# Patient Record
Sex: FEMALE | Race: BLACK OR AFRICAN AMERICAN | NOT HISPANIC OR LATINO
[De-identification: names, ages, dates, MRNs, and addresses within clinical notes are randomized per-mention and may not be internally consistent; named-entity substitution may affect disease eponyms.]

---

## 2018-05-14 ENCOUNTER — RESULT REVIEW (OUTPATIENT)
Age: 33
End: 2018-05-14

## 2019-04-11 ENCOUNTER — RESULT REVIEW (OUTPATIENT)
Age: 34
End: 2019-04-11

## 2019-06-24 ENCOUNTER — OUTPATIENT (OUTPATIENT)
Dept: OUTPATIENT SERVICES | Facility: HOSPITAL | Age: 34
LOS: 1 days | Discharge: HOME | End: 2019-06-24

## 2019-06-24 ENCOUNTER — RESULT REVIEW (OUTPATIENT)
Age: 34
End: 2019-06-24

## 2019-06-25 DIAGNOSIS — E78.00 PURE HYPERCHOLESTEROLEMIA, UNSPECIFIED: ICD-10-CM

## 2019-06-25 DIAGNOSIS — N39.0 URINARY TRACT INFECTION, SITE NOT SPECIFIED: ICD-10-CM

## 2019-06-25 DIAGNOSIS — Z00.00 ENCOUNTER FOR GENERAL ADULT MEDICAL EXAMINATION WITHOUT ABNORMAL FINDINGS: ICD-10-CM

## 2019-07-22 ENCOUNTER — OUTPATIENT (OUTPATIENT)
Dept: OUTPATIENT SERVICES | Facility: HOSPITAL | Age: 34
LOS: 1 days | Discharge: HOME | End: 2019-07-22

## 2019-07-23 DIAGNOSIS — E78.00 PURE HYPERCHOLESTEROLEMIA, UNSPECIFIED: ICD-10-CM

## 2019-08-05 ENCOUNTER — RESULT REVIEW (OUTPATIENT)
Age: 34
End: 2019-08-05

## 2020-01-13 ENCOUNTER — RESULT REVIEW (OUTPATIENT)
Age: 35
End: 2020-01-13

## 2020-08-12 ENCOUNTER — RESULT REVIEW (OUTPATIENT)
Age: 35
End: 2020-08-12

## 2021-02-08 ENCOUNTER — RESULT REVIEW (OUTPATIENT)
Age: 36
End: 2021-02-08

## 2021-03-17 ENCOUNTER — RESULT REVIEW (OUTPATIENT)
Age: 36
End: 2021-03-17

## 2021-03-31 PROBLEM — Z00.00 ENCOUNTER FOR PREVENTIVE HEALTH EXAMINATION: Status: ACTIVE | Noted: 2021-03-31

## 2021-04-13 ENCOUNTER — APPOINTMENT (OUTPATIENT)
Dept: GYNECOLOGIC ONCOLOGY | Facility: CLINIC | Age: 36
End: 2021-04-13
Payer: COMMERCIAL

## 2021-04-13 VITALS
HEART RATE: 76 BPM | SYSTOLIC BLOOD PRESSURE: 129 MMHG | DIASTOLIC BLOOD PRESSURE: 83 MMHG | BODY MASS INDEX: 18.78 KG/M2 | WEIGHT: 110 LBS | HEIGHT: 64 IN

## 2021-04-13 DIAGNOSIS — Z78.9 OTHER SPECIFIED HEALTH STATUS: ICD-10-CM

## 2021-04-13 PROCEDURE — 99072 ADDL SUPL MATRL&STAF TM PHE: CPT

## 2021-04-13 PROCEDURE — 99203 OFFICE O/P NEW LOW 30 MIN: CPT

## 2021-04-13 RX ORDER — NORETHINDRONE ACETATE AND ETHINYL ESTRADIOL 1.5; 3 MG/1; UG/1
TABLET ORAL
Refills: 0 | Status: ACTIVE | COMMUNITY

## 2021-04-13 NOTE — HISTORY OF PRESENT ILLNESS
[FreeTextEntry1] : Referred by (GYN) Dr. Watters\par PCP: Dr. Ring\par \par Ms. Sommers is a 35 year old  female, with a LMP on 3/21, referred from Dr. Watters, for persistent cervical dysplasia.  Patient notes an abnormal pap smear in .\par \par 2018- Pap- ASCUS\par 2019- Pap- HSIL\par 2019- Colposcopy- \par   ECC- extremely scant fragments of benign endocervical glands and of benign endometrial tissue with endometrial stromal breakdown\par   Cervix at 11- LUAN 2\par   Cervix at 1- LUAN 2\par   Cervix at 8 - ULAN 2\par \par \par 2019- Upper cervix LEEP- HSIL, negative margins\par   Lower cervix LEEP- HSIL, negative endocervical margins, positive ectocervical margin\par   Post ECC- dysplastic squamous epithelium \par \par 2020- Pap- negative\par \par 2020- Pap- ASCUS\par \par 2021- Pap- ASCUS\par \par 3/17/2021- Colposcopy- \par   ECC- endocervical mucosa admixed with inflammatory mucin\par   Endocervical polyp- negative\par   Cervix at 2- negative\par   Cervix at 5- high grade dysplasia\par    Cervix at 7- high grade dysplasia\par \par PMHx- n/a\par PSHx- LEEP\par Social Hx- monogamous relationship, never smoker, did not receive the Gardasil vaccine\par \par She has postcoital bleeding.  She reports no pelvic pain or pressure.  She denies any other associated signs or symptoms.  She is here today to discuss further management.  \par \par HM\par Pap- see above\par Mammo- n/a\par Colonoscopy- n/a

## 2021-04-13 NOTE — DISCUSSION/SUMMARY
[Reviewed Clinical Lab Test(s)] : Results of clinical tests were reviewed. [Discuss Alternatives/Risks/Benefits w/Patient] : All alternatives, risks, and benefits were discussed with the patient/family and all questions were answered.  Patient expressed good understanding and appreciates the importance of follow up as recommended. [FreeTextEntry1] : I sat down with Kate and reviewed the diagnosis and management of high grade cervical dysplasia. I would recommend proceeding with a cone biopsy in the ambulatory surgery unit. I explained that a cone biopsy has both diagnostic and therapeutic benefits. She understands that there is a small risk of finding an invasive cervical cancer on cone biopsy and that further surgery would be required in this circumstance. We reviewed the risks and benefits of the procedure. We discussed the role of persistent HPV infection in the generation of lower genital tract dysplasia. Kate had the opportunity to ask questions and expressed understanding of the information presented. She would like to proceed with a CKC and will be scheduled in the near future.

## 2021-04-13 NOTE — PHYSICAL EXAM
[Abnormal] : Cervix: Abnormal [Normal] : Anus and perineum: Normal sphincter tone, no masses, no prolapse. [de-identified] : VIA - AWE consistent with high grade dysplasia on posterior cervix 4-8 [Fully active, able to carry on all pre-disease performance without restriction] : Status 0 - Fully active, able to carry on all pre-disease performance without restriction

## 2021-04-13 NOTE — OB HISTORY
[Total Preg ___] : : [unfilled] [Abortions ___] : [unfilled] (abortions) [Definite ___ (Date)] : the last menstrual period was [unfilled] [Menarche Age ____] : age at menarche was [unfilled]

## 2021-04-13 NOTE — PAST MEDICAL HISTORY
[Menstruating] : The patient is menstruating [Menarche Age ____] : age at menarche was [unfilled] [Definite ___ (Date)] : the last menstrual period was [unfilled] [Total Preg ___] : G[unfilled] [Abortions ___] : Abortions:[unfilled]

## 2021-05-17 ENCOUNTER — NON-APPOINTMENT (OUTPATIENT)
Age: 36
End: 2021-05-17

## 2021-07-09 ENCOUNTER — OUTPATIENT (OUTPATIENT)
Dept: OUTPATIENT SERVICES | Facility: HOSPITAL | Age: 36
LOS: 1 days | End: 2021-07-09

## 2021-07-09 VITALS
RESPIRATION RATE: 16 BRPM | WEIGHT: 115.08 LBS | TEMPERATURE: 98 F | HEART RATE: 78 BPM | OXYGEN SATURATION: 98 % | HEIGHT: 64 IN | DIASTOLIC BLOOD PRESSURE: 78 MMHG | SYSTOLIC BLOOD PRESSURE: 120 MMHG

## 2021-07-09 DIAGNOSIS — N87.9 DYSPLASIA OF CERVIX UTERI, UNSPECIFIED: ICD-10-CM

## 2021-07-09 DIAGNOSIS — R87.619 UNSPECIFIED ABNORMAL CYTOLOGICAL FINDINGS IN SPECIMENS FROM CERVIX UTERI: ICD-10-CM

## 2021-07-09 DIAGNOSIS — Z87.898 PERSONAL HISTORY OF OTHER SPECIFIED CONDITIONS: Chronic | ICD-10-CM

## 2021-07-09 LAB
HCG UR QL: NEGATIVE — SIGNIFICANT CHANGE UP
HCT VFR BLD CALC: 44.8 % — SIGNIFICANT CHANGE UP (ref 34.5–45)
HGB BLD-MCNC: 14.8 G/DL — SIGNIFICANT CHANGE UP (ref 11.5–15.5)
MCHC RBC-ENTMCNC: 30.8 PG — SIGNIFICANT CHANGE UP (ref 27–34)
MCHC RBC-ENTMCNC: 33 GM/DL — SIGNIFICANT CHANGE UP (ref 32–36)
MCV RBC AUTO: 93.3 FL — SIGNIFICANT CHANGE UP (ref 80–100)
NRBC # BLD: 0 /100 WBCS — SIGNIFICANT CHANGE UP
NRBC # FLD: 0 K/UL — SIGNIFICANT CHANGE UP
PLATELET # BLD AUTO: 323 K/UL — SIGNIFICANT CHANGE UP (ref 150–400)
RBC # BLD: 4.8 M/UL — SIGNIFICANT CHANGE UP (ref 3.8–5.2)
RBC # FLD: 12.1 % — SIGNIFICANT CHANGE UP (ref 10.3–14.5)
WBC # BLD: 4.46 K/UL — SIGNIFICANT CHANGE UP (ref 3.8–10.5)
WBC # FLD AUTO: 4.46 K/UL — SIGNIFICANT CHANGE UP (ref 3.8–10.5)

## 2021-07-09 RX ORDER — SODIUM CHLORIDE 9 MG/ML
1000 INJECTION, SOLUTION INTRAVENOUS
Refills: 0 | Status: DISCONTINUED | OUTPATIENT
Start: 2021-07-23 | End: 2021-08-06

## 2021-07-09 NOTE — H&P PST ADULT - HISTORY OF PRESENT ILLNESS
This is a 34 y/o female who presents with h/o LEEP procedure in 2019. Recently had abnormal pap smear. Further intervention warranted. Scheduled for cold knife cone biopsy

## 2021-07-09 NOTE — H&P PST ADULT - NSICDXPROBLEM_GEN_ALL_CORE_FT
PROBLEM DIAGNOSES  Problem: Abnormal Pap smear of cervix  Assessment and Plan: This is a 34 y/o female who is scheduled for cold knife cone biopsy on 7-23-21  * Scheduled for covid testing  * Given preo instructions

## 2021-07-09 NOTE — H&P PST ADULT - NSICDXPASTMEDICALHX_GEN_ALL_CORE_FT
PAST MEDICAL HISTORY:  Abnormal Pap smear of cervix      x 1    History of abnormal Pap smear 2019

## 2021-07-16 DIAGNOSIS — Z01.818 ENCOUNTER FOR OTHER PREPROCEDURAL EXAMINATION: ICD-10-CM

## 2021-07-20 ENCOUNTER — APPOINTMENT (OUTPATIENT)
Dept: DISASTER EMERGENCY | Facility: CLINIC | Age: 36
End: 2021-07-20

## 2021-07-20 ENCOUNTER — NON-APPOINTMENT (OUTPATIENT)
Age: 36
End: 2021-07-20

## 2021-07-21 LAB — SARS-COV-2 N GENE NPH QL NAA+PROBE: NOT DETECTED

## 2021-07-22 ENCOUNTER — TRANSCRIPTION ENCOUNTER (OUTPATIENT)
Age: 36
End: 2021-07-22

## 2021-07-23 ENCOUNTER — RESULT REVIEW (OUTPATIENT)
Age: 36
End: 2021-07-23

## 2021-07-23 ENCOUNTER — OUTPATIENT (OUTPATIENT)
Dept: OUTPATIENT SERVICES | Facility: HOSPITAL | Age: 36
LOS: 1 days | Discharge: HOME | End: 2021-07-23
Payer: COMMERCIAL

## 2021-07-23 ENCOUNTER — APPOINTMENT (OUTPATIENT)
Dept: GYNECOLOGIC ONCOLOGY | Facility: HOSPITAL | Age: 36
End: 2021-07-23

## 2021-07-23 VITALS
HEART RATE: 75 BPM | HEIGHT: 64 IN | SYSTOLIC BLOOD PRESSURE: 95 MMHG | DIASTOLIC BLOOD PRESSURE: 67 MMHG | OXYGEN SATURATION: 100 % | WEIGHT: 115.08 LBS | TEMPERATURE: 98 F | RESPIRATION RATE: 18 BRPM

## 2021-07-23 VITALS — HEART RATE: 69 BPM | OXYGEN SATURATION: 100 % | RESPIRATION RATE: 16 BRPM

## 2021-07-23 DIAGNOSIS — N87.9 DYSPLASIA OF CERVIX UTERI, UNSPECIFIED: ICD-10-CM

## 2021-07-23 DIAGNOSIS — Z87.898 PERSONAL HISTORY OF OTHER SPECIFIED CONDITIONS: Chronic | ICD-10-CM

## 2021-07-23 LAB — HCG UR QL: NEGATIVE — SIGNIFICANT CHANGE UP

## 2021-07-23 PROCEDURE — 88342 IMHCHEM/IMCYTCHM 1ST ANTB: CPT | Mod: 26

## 2021-07-23 PROCEDURE — 88305 TISSUE EXAM BY PATHOLOGIST: CPT | Mod: 26

## 2021-07-23 PROCEDURE — 57520 CONIZATION OF CERVIX: CPT

## 2021-07-23 PROCEDURE — 88341 IMHCHEM/IMCYTCHM EA ADD ANTB: CPT | Mod: 26

## 2021-07-23 PROCEDURE — 88307 TISSUE EXAM BY PATHOLOGIST: CPT | Mod: 26

## 2021-07-23 RX ORDER — SODIUM CHLORIDE 9 MG/ML
1000 INJECTION, SOLUTION INTRAVENOUS
Refills: 0 | Status: DISCONTINUED | OUTPATIENT
Start: 2021-07-23 | End: 2021-08-06

## 2021-07-23 RX ORDER — NORETHINDRONE AND ETHINYL ESTRADIOL 0.4-0.035
1 KIT ORAL
Qty: 0 | Refills: 0 | DISCHARGE

## 2021-07-23 NOTE — BRIEF OPERATIVE NOTE - NSICDXBRIEFPROCEDURE_GEN_ALL_CORE_FT
PROCEDURES:  Cervical cold knife cone biopsy 23-Jul-2021 10:14:40  Andree Guy  Endocervical curettage 23-Jul-2021 10:14:56  Andree Guy

## 2021-07-23 NOTE — ASU DISCHARGE PLAN (ADULT/PEDIATRIC) - ASU DC SPECIAL INSTRUCTIONSFT
Return to your regular way of eating.  Resume normal activity as tolerated, but no heavy lifting or strenuous activity for 6 weeks.  No driving for next 2 weeks and/or while on narcotic pain medication.  Complete vaginal rest, no tampons, no douching, no tub bathing, no sexual activities for 6 weeks unless otherwise instructed by your doctor.  Call your doctor with any signs and symptoms of infection such as fever, chills, nausea or vomiting.  Call your doctor with redness or swelling at the incision site, fluid leakage or wound separation.  Call your doctor if you're unable to tolerate food or have difficulty urinating.  Call your doctor if you have pain that is not relieved by your prescribed medications.  Notify your doctor with any other concerns.  Follow up with Dr. Rodas on Aug 10.

## 2021-07-23 NOTE — ASU DISCHARGE PLAN (ADULT/PEDIATRIC) - CARE PROVIDER_API CALL
Onset: today    Location / description: today has been coughing up small specks of blood. No SOB or chest pain. Phlegm also yellow in color. Denies streaks or clots of blood in phlegm. +nasal d/c for 1 week. No fever. Advised to FU within 2-3 days. Verb understanding care advice and to call back with further questions/concerns. Pain Scale (1 - 10), 10 highest: 0    What improves/worsens symptoms: nothing    Symptom specific medications: see list    LMP : No LMP recorded.   Are you pregnant or breast feeding: na    Recent Care: 12/12/18 [facility]    Reason for Disposition  â¢ [1] More than one episode of coughing up blood AND [2] < 1 tablespoon (15 ml) of pure red blood    Protocols used: COUGHING UP BLOOD-A-AH
Regardinyr/ red specks in vision   ----- Message from [address] sent at 2019 11:16 PM CST -----  Patient Name: Arielle Brewer  Specialist or PCP:Dr Guicho Hyman   Pregnant (If Yes, how long?):no  Symptoms:red specks in vision  Call Back #:[phone]  Is the patientâs permanent residence located in [place], [place], or a compact State?  Tami Amaro [place] [address]  Call Center Account #:[id number]
Bina Rodas)  Gynecologic Oncology; Obstetrics and Gynecology  [address] [place] [address]  Phone: [phone]  Fax: [phone]  Established Patient  Scheduled Appointment: 08/23/2021 04:00 PM

## 2021-07-23 NOTE — ASU DISCHARGE PLAN (ADULT/PEDIATRIC) - PROVIDER TOKENS
PROVIDER:[TOKEN:[[id number]],SCHEDULEDAPPT:[08/23/2021],SCHEDULEDAPPTTIME:[04:00 PM],ESTABLISHEDPATIENT:[T]]

## 2021-07-23 NOTE — BRIEF OPERATIVE NOTE - OPERATION/FINDINGS
EUA: External genitalia wnl. Small, anteverted uterus. No adnexal masses palpated  CKC: Stay sutures placed on cervix at 3 and 9 o'clock. Lugol's solution applied to cervical face, revealing absent uptake at approximately 6 o'clock. Approximately 1mm thin cervical mucosa taken from entire cervical circumference using cold knife and sent to pathology. ECC was taken after conization of cervix, also sent to pathology. Incised area cauterized using bovey tip, also achieving hemostasis EUA: External genitalia wnl. Small, anteverted uterus. No adnexal masses palpated  CKC: Stay sutures placed on cervix at 3 and 9 o'clock. Acetic acid applied to cervical face, no obvious uptake. Lugol's solution applied next, revealing absent uptake at approximately 6 o'clock. Approximately 1mm thin slice of cervical mucosa taken from cervical circumference using cold knife, followed by ECC. Cervical mucosa and ECC sent to pathology. Incised area cauterized using bovey tip, also achieving hemostasis EUA: External genitalia wnl. Small, anteverted uterus. No adnexal masses palpated

## 2021-08-04 LAB — SURGICAL PATHOLOGY STUDY: SIGNIFICANT CHANGE UP

## 2021-08-06 ENCOUNTER — NON-APPOINTMENT (OUTPATIENT)
Age: 36
End: 2021-08-06

## 2021-08-10 ENCOUNTER — APPOINTMENT (OUTPATIENT)
Dept: GYNECOLOGIC ONCOLOGY | Facility: CLINIC | Age: 36
End: 2021-08-10
Payer: COMMERCIAL

## 2021-08-10 VITALS
TEMPERATURE: 98.2 F | OXYGEN SATURATION: 96 % | HEART RATE: 79 BPM | SYSTOLIC BLOOD PRESSURE: 115 MMHG | HEIGHT: 64 IN | DIASTOLIC BLOOD PRESSURE: 80 MMHG

## 2021-08-10 PROCEDURE — 99024 POSTOP FOLLOW-UP VISIT: CPT

## 2021-08-11 PROBLEM — Z87.898 PERSONAL HISTORY OF OTHER SPECIFIED CONDITIONS: Chronic | Status: ACTIVE | Noted: 2021-07-09

## 2021-08-11 PROBLEM — R87.619 UNSPECIFIED ABNORMAL CYTOLOGICAL FINDINGS IN SPECIMENS FROM CERVIX UTERI: Chronic | Status: ACTIVE | Noted: 2021-07-09

## 2021-08-11 PROBLEM — O03.9 COMPLETE OR UNSPECIFIED SPONTANEOUS ABORTION WITHOUT COMPLICATION: Chronic | Status: ACTIVE | Noted: 2021-07-09

## 2022-02-15 ENCOUNTER — APPOINTMENT (OUTPATIENT)
Dept: GYNECOLOGIC ONCOLOGY | Facility: CLINIC | Age: 37
End: 2022-02-15
Payer: COMMERCIAL

## 2022-02-15 ENCOUNTER — NON-APPOINTMENT (OUTPATIENT)
Age: 37
End: 2022-02-15

## 2022-02-15 VITALS
HEART RATE: 85 BPM | HEIGHT: 64 IN | SYSTOLIC BLOOD PRESSURE: 112 MMHG | BODY MASS INDEX: 19.81 KG/M2 | DIASTOLIC BLOOD PRESSURE: 73 MMHG | WEIGHT: 116 LBS

## 2022-02-15 DIAGNOSIS — N87.9 DYSPLASIA OF CERVIX UTERI, UNSPECIFIED: ICD-10-CM

## 2022-02-15 PROCEDURE — 99212 OFFICE O/P EST SF 10 MIN: CPT

## 2022-02-16 LAB — HPV HIGH+LOW RISK DNA PNL CVX: NOT DETECTED

## 2022-02-28 LAB — CYTOLOGY CVX/VAG DOC THIN PREP: ABNORMAL

## 2022-03-08 ENCOUNTER — NON-APPOINTMENT (OUTPATIENT)
Age: 37
End: 2022-03-08
